# Patient Record
Sex: MALE | Race: WHITE
[De-identification: names, ages, dates, MRNs, and addresses within clinical notes are randomized per-mention and may not be internally consistent; named-entity substitution may affect disease eponyms.]

---

## 2019-11-05 ENCOUNTER — HOSPITAL ENCOUNTER (EMERGENCY)
Dept: HOSPITAL 96 - M.ERS | Age: 44
Discharge: HOME | End: 2019-11-05
Payer: COMMERCIAL

## 2019-11-05 VITALS — DIASTOLIC BLOOD PRESSURE: 71 MMHG | SYSTOLIC BLOOD PRESSURE: 116 MMHG

## 2019-11-05 VITALS — WEIGHT: 187 LBS | HEIGHT: 75 IN | BODY MASS INDEX: 23.25 KG/M2

## 2019-11-05 DIAGNOSIS — I47.1: Primary | ICD-10-CM

## 2019-11-05 DIAGNOSIS — Z91.018: ICD-10-CM

## 2019-11-05 DIAGNOSIS — F17.210: ICD-10-CM

## 2019-11-05 DIAGNOSIS — Z88.7: ICD-10-CM

## 2019-11-05 LAB
ABSOLUTE BASOPHILS: 0.1 THOU/UL (ref 0–0.2)
ABSOLUTE EOSINOPHILS: 0 THOU/UL (ref 0–0.7)
ABSOLUTE MONOCYTES: 1 THOU/UL (ref 0–1.2)
ALBUMIN SERPL-MCNC: 3.3 G/DL (ref 3.4–5)
ALP SERPL-CCNC: 54 U/L (ref 46–116)
ALT SERPL-CCNC: 66 U/L (ref 30–65)
ANION GAP SERPL CALC-SCNC: 10 MMOL/L (ref 7–16)
AST SERPL-CCNC: 69 U/L (ref 15–37)
BASOPHILS NFR BLD AUTO: 0.7 %
BILIRUB SERPL-MCNC: 0.3 MG/DL
BUN SERPL-MCNC: 8 MG/DL (ref 7–18)
CALCIUM SERPL-MCNC: 8.6 MG/DL (ref 8.5–10.1)
CHLORIDE SERPL-SCNC: 104 MMOL/L (ref 98–107)
CO2 SERPL-SCNC: 27 MMOL/L (ref 21–32)
CREAT SERPL-MCNC: 1.2 MG/DL (ref 0.6–1.3)
EOSINOPHIL NFR BLD: 0.3 %
GLUCOSE SERPL-MCNC: 98 MG/DL (ref 70–99)
GRANULOCYTES NFR BLD MANUAL: 71.7 %
HCT VFR BLD CALC: 51.8 % (ref 42–52)
HGB BLD-MCNC: 18.2 GM/DL (ref 14–18)
LYMPHOCYTES # BLD: 2 THOU/UL (ref 0.8–5.3)
LYMPHOCYTES NFR BLD AUTO: 18.5 %
MCH RBC QN AUTO: 33.5 PG (ref 26–34)
MCHC RBC AUTO-ENTMCNC: 35.2 G/DL (ref 28–37)
MCV RBC: 95.2 FL (ref 80–100)
MONOCYTES NFR BLD: 8.8 %
MPV: 9.5 FL. (ref 7.2–11.1)
NEUTROPHILS # BLD: 7.9 THOU/UL (ref 1.6–8.1)
NT-PRO BRAIN NAT PEPTIDE: 27 PG/ML (ref ?–300)
NUCLEATED RBCS: 0 /100WBC
PLATELET COUNT*: 176 THOU/UL (ref 150–400)
POTASSIUM SERPL-SCNC: 3.4 MMOL/L (ref 3.5–5.1)
PROT SERPL-MCNC: 5.6 G/DL (ref 6.4–8.2)
RBC # BLD AUTO: 5.44 MIL/UL (ref 4.5–6)
RDW-CV: 12.5 % (ref 10.5–14.5)
SODIUM SERPL-SCNC: 141 MMOL/L (ref 136–145)
WBC # BLD AUTO: 11 THOU/UL (ref 4–11)

## 2019-11-06 NOTE — EKG
[place] [address]
Phone:  [phone]                     ELECTROCARDIOGRAM REPORT      
_______________________________________________________________________________
 
Name:       STEW PAT         Room:                      [facility]#:  [id number]      Account #:      [id number]  
Admission:  19     Attend Phys:                         
Discharge:  19     Date of Birth:  [birth year]  
         Report #: [id number]
    [id number]
_______________________________________________________________________________
THIS REPORT FOR:  //name//                      
 
                         [facility] ED
                                       
Test Date:    2019               Test Time:    14:32:59
Pat Name:     STEW ADILIA     Department:   
Patient ID:   [id number]            Room:          
Gender:       M                        Technician:   JAIME
:          1975               Requested By: Noah Portillo
Order Number: [id number] MD:   Mitchell Zavala
                                 Measurements
Intervals                              Axis          
Rate:         101                      P:            74
VT:           124                      QRS:          2
QRSD:         94                       T:            62
QT:           345                                    
QTc:          448                                    
                           Interpretive Statements
Sinus tachycardia
Compared to ECG 2012 13:05:04
SUPRAVENTRICULAR TACHYCARDIA has reverted to sinus rhythm
 
Electronically Signed On 2019 10:34:13 CST by Mitchell Zavala
[email or web address]
 
 
 
 
 
 
 
 
 
 
 
 
 
 
 
 
 
 
 
  <ELECTRONICALLY SIGNED>
                                           By: Mitchell Zavala MD, [facility]     
  19     1034
D: 19 1432   _____________________________________
T: 19 1432   Mitchell Zavala MD, FACC       /EPI
